# Patient Record
Sex: MALE | Race: WHITE | NOT HISPANIC OR LATINO
[De-identification: names, ages, dates, MRNs, and addresses within clinical notes are randomized per-mention and may not be internally consistent; named-entity substitution may affect disease eponyms.]

---

## 2017-10-03 ENCOUNTER — RECORDS - HEALTHEAST (OUTPATIENT)
Dept: ADMINISTRATIVE | Facility: OTHER | Age: 52
End: 2017-10-03

## 2017-10-09 ENCOUNTER — RECORDS - HEALTHEAST (OUTPATIENT)
Dept: ADMINISTRATIVE | Facility: OTHER | Age: 52
End: 2017-10-09

## 2017-10-10 ENCOUNTER — COMMUNICATION - HEALTHEAST (OUTPATIENT)
Dept: FAMILY MEDICINE | Facility: CLINIC | Age: 52
End: 2017-10-10

## 2017-10-23 ENCOUNTER — OFFICE VISIT - HEALTHEAST (OUTPATIENT)
Dept: FAMILY MEDICINE | Facility: CLINIC | Age: 52
End: 2017-10-23

## 2017-10-23 DIAGNOSIS — K63.5 COLON POLYP: ICD-10-CM

## 2017-10-23 DIAGNOSIS — Z01.818 PREOPERATIVE EXAMINATION: ICD-10-CM

## 2017-10-23 DIAGNOSIS — K62.89 RECTAL MASS: ICD-10-CM

## 2017-10-23 LAB
ATRIAL RATE - MUSE: 48 BPM
DIASTOLIC BLOOD PRESSURE - MUSE: NORMAL MMHG
INTERPRETATION ECG - MUSE: NORMAL
P AXIS - MUSE: 25 DEGREES
PR INTERVAL - MUSE: 160 MS
QRS DURATION - MUSE: 82 MS
QT - MUSE: 418 MS
QTC - MUSE: 373 MS
R AXIS - MUSE: 24 DEGREES
SYSTOLIC BLOOD PRESSURE - MUSE: NORMAL MMHG
T AXIS - MUSE: 16 DEGREES
VENTRICULAR RATE- MUSE: 48 BPM

## 2017-10-23 ASSESSMENT — MIFFLIN-ST. JEOR: SCORE: 1585.82

## 2017-11-03 ENCOUNTER — RECORDS - HEALTHEAST (OUTPATIENT)
Dept: ADMINISTRATIVE | Facility: OTHER | Age: 52
End: 2017-11-03

## 2017-11-04 ENCOUNTER — RECORDS - HEALTHEAST (OUTPATIENT)
Dept: ADMINISTRATIVE | Facility: OTHER | Age: 52
End: 2017-11-04

## 2019-07-30 ENCOUNTER — OFFICE VISIT - HEALTHEAST (OUTPATIENT)
Dept: FAMILY MEDICINE | Facility: CLINIC | Age: 54
End: 2019-07-30

## 2019-07-30 DIAGNOSIS — S61.011A LACERATION OF RIGHT THUMB WITHOUT FOREIGN BODY WITHOUT DAMAGE TO NAIL, INITIAL ENCOUNTER: ICD-10-CM

## 2019-08-06 ENCOUNTER — OFFICE VISIT - HEALTHEAST (OUTPATIENT)
Dept: FAMILY MEDICINE | Facility: CLINIC | Age: 54
End: 2019-08-06

## 2019-08-06 DIAGNOSIS — S61.011D LACERATION OF RIGHT THUMB WITHOUT FOREIGN BODY WITHOUT DAMAGE TO NAIL, SUBSEQUENT ENCOUNTER: ICD-10-CM

## 2019-08-06 ASSESSMENT — MIFFLIN-ST. JEOR: SCORE: 1603.96

## 2020-07-27 ENCOUNTER — OFFICE VISIT - HEALTHEAST (OUTPATIENT)
Dept: FAMILY MEDICINE | Facility: CLINIC | Age: 55
End: 2020-07-27

## 2020-07-27 DIAGNOSIS — R09.81 CONGESTION OF PARANASAL SINUS: ICD-10-CM

## 2020-07-27 DIAGNOSIS — I10 BENIGN ESSENTIAL HYPERTENSION: ICD-10-CM

## 2020-07-27 RX ORDER — NAPROXEN 500 MG/1
500 TABLET ORAL 2 TIMES DAILY WITH MEALS
Qty: 60 TABLET | Refills: 2 | Status: SHIPPED | OUTPATIENT
Start: 2020-07-27

## 2021-05-26 VITALS
TEMPERATURE: 98.2 F | DIASTOLIC BLOOD PRESSURE: 83 MMHG | OXYGEN SATURATION: 98 % | SYSTOLIC BLOOD PRESSURE: 135 MMHG | HEART RATE: 61 BPM | RESPIRATION RATE: 12 BRPM

## 2021-05-30 NOTE — PROGRESS NOTES
Assessment:        1. Laceration of right thumb without foreign body without damage to nail, initial encounter  lidocaine 10 mg/mL (1 %) injection 3 mL         Plan:       The wound area was cleansed with chlorhexidine gluconate and draped in a sterile fashion.  The wound area was anesthetized with Lidocaine 1% without epinephrine with added sodium bicarbonate.  The wound was explored with the following results No foreign bodies found, No tendon laceration seen.  The wound was repaired with 4-0 Nylon; 6 sutures were used.  The wound was dressed and antibiotic ointment was applied.  Wound care discussed.  Tetanus immunization not indicated.  Suture removal in 7 days.   Discussed signs of worsening symptoms and when to follow-up with PCP if no symptom improvement.      Patient Instructions   Your wound was closed with sutures and covered with antibiotic ointment and a non-adhesive dressing.    General wound maintenance:  - Dressings should be left in place for 24 hours, afterwards wound may be left open to air  - May continue to cover injury site with a topical antibiotic such as neosporin  - A simple band-aid will work to protect small wounds and keep topical antibiotic on  - Cloth gauze are more useful for covering larger wounds with tape to secure  - May shower normally if it has been longer than 12 hours since sutures were placed    Avoid any swimming until after sutures have been removed.    Follow-up in 7 days to remove your sutures.    Come back sooner if you develop fever or your wound site becomes increasingly red, tender, producing pus or other drainage, or reopens.      Subjective:       Quang Parks is a 54 y.o. male who presents for evaluation of a laceration to the right thumb. Injury occurred this morning. The mechanism of the wound was a piece of glass. The patient reports no numbness and no paresthesias in the affected area. There were no other injuries. The tetanus status is within past 5 years.      The following portions of the patient's history were reviewed and updated as appropriate: allergies, current medications and problem list.    Review of Systems  Pertinent items are noted in HPI.    Allergies  No Known Allergies      Objective:       /78 (Patient Site: Right Arm, Patient Position: Sitting, Cuff Size: Adult Regular)   Pulse 70   Temp 97.9  F (36.6  C) (Oral)   Resp 17   Wt 176 lb 2 oz (79.9 kg)   SpO2 96%   BMI 27.18 kg/m      There is a curved laceration measuring approximately 2.5 cm in length on the right dorsal thumb. Examination of the wound for foreign bodies and devitalized tissue showed none.  Examination of the surrounding area for neural or vascular damage showed none. Able to visualize the tendon and 1st MTP joint capsule, but no damage. FROM of all thumb joints without difficulty.

## 2021-05-30 NOTE — PATIENT INSTRUCTIONS - HE
Your wound was closed with sutures and covered with antibiotic ointment and a non-adhesive dressing.    General wound maintenance:  - Dressings should be left in place for 24 hours, afterwards wound may be left open to air  - May continue to cover injury site with a topical antibiotic such as neosporin  - A simple band-aid will work to protect small wounds and keep topical antibiotic on  - Cloth gauze are more useful for covering larger wounds with tape to secure  - May shower normally if it has been longer than 12 hours since sutures were placed    Avoid any swimming until after sutures have been removed.    Follow-up in 7 days to remove your sutures.    Come back sooner if you develop fever or your wound site becomes increasingly red, tender, producing pus or other drainage, or reopens.

## 2021-05-31 VITALS — HEIGHT: 68 IN | WEIGHT: 174 LBS | BODY MASS INDEX: 26.37 KG/M2

## 2021-05-31 NOTE — PROGRESS NOTES
Chief Complaint   Patient presents with     remove stitches     right thumb         HPI:    Patient is here for suture removal. He sustained laceration to R thumb and had it repaired 7/30. Patient reported doing well today with minimal pain. No fever, chills. Redness around wound, pus drainage from wound. However, he said he has not been able to straighten the affected joint much. NO tingling nor numbness of the R thumb.    ROS: Pertinent ROS noted in HPI.     No Known Allergies    Patient Active Problem List   Diagnosis     History of alcohol abuse     Gout attack     Colon polyp       Family History   Adopted: Yes   Family history unknown: Yes       Social History     Socioeconomic History     Marital status:      Spouse name: Not on file     Number of children: Not on file     Years of education: Not on file     Highest education level: Not on file   Occupational History     Not on file   Social Needs     Financial resource strain: Not on file     Food insecurity:     Worry: Not on file     Inability: Not on file     Transportation needs:     Medical: Not on file     Non-medical: Not on file   Tobacco Use     Smoking status: Former Smoker     Smokeless tobacco: Never Used     Tobacco comment: smoked for 3 months in 1994   Substance and Sexual Activity     Alcohol use: Not on file     Drug use: Not on file     Sexual activity: Not on file   Lifestyle     Physical activity:     Days per week: Not on file     Minutes per session: Not on file     Stress: Not on file   Relationships     Social connections:     Talks on phone: Not on file     Gets together: Not on file     Attends Ep[employer]opalian service: Not on file     Active member of club or organization: Not on file     Attends meetings of clubs or organizations: Not on file     Relationship status: Not on file     Intimate partner violence:     Fear of current or ex partner: Not on file     Emotionally abused: Not on file     Physically abused: Not on file      Forced sexual activity: Not on file   Other Topics Concern     Not on file   Social History Narrative     Not on file         Objective:      Vitals:    08/06/19 0715   BP: 114/62   Pulse: 65   SpO2: 98%       Gen:NAD  MSK: There is a wound over the dorsal aspect of the right first interphalangeal joint with 6 sutures in interrupted fashion. Wound has healed well with good approximation.  No dehiscence. No erythema, edema, fluctuance, nor purulence. The interphalangeal joint was held fixed at about 45 degrees. Patient can fully flex the affected joint but he could not straighten the joint. Patient has good strength (flexion) of the affected joint.   Neuro: intact gross sensation of the right thumb.  CV: Normal cap refill of the right distal phalanx.     Assessment:    Laceration of R thumb. Initial visit did not noted any evidence of tendon injury. However, patient has had difficulty extending the right first interphalangeal joint.    Plan    #1. Wound was cleaned, and sutures removed in standard fashion followed by placement of steri strips.  #2. Discussed home cares, and signs/symptoms of wound infection with f/u advice as indicated.  #3. Advised patient to follow up with Orthopedics ([facility]) within a week to assess range of motion issue.

## 2021-06-03 VITALS — WEIGHT: 178 LBS | BODY MASS INDEX: 26.98 KG/M2 | HEIGHT: 68 IN

## 2021-06-03 VITALS — BODY MASS INDEX: 27.18 KG/M2 | WEIGHT: 176.13 LBS

## 2021-06-10 NOTE — PROGRESS NOTES
Assessment & Plan:       1. Congestion of paranasal sinus  fluticasone (VERAMYST) 27.5 mcg/actuation nasal spray    naproxen (NAPROSYN) 500 MG tablet   2. Benign essential hypertension        Medical Decision Making  Patient presents with acute onset headache most consistent with sinus congestion of the left frontal sinus.  There are no signs of bacterial sinusitis as patient has no fevers and no tenderness to percussion over the sinuses.  Will have patient use a trial of nasal steroids.  Will also use oral naproxen to help with headaches.  Recommended taking naproxen with food.  Patient will follow-up nonurgently to have blood pressure rechecked in 1 to 2 weeks.  Discussed signs of worsening symptoms and when to follow-up with PCP if no symptom improvement.     Patient Instructions   You were seen today for sinus congestion and/or pain. This is likely due to a viral illness.    Symptoms management:  - May use Tylenol or Ibuprofen for discomfort and/or fever if present  - May try saline irrigation to relieve congestion (see instructions below)  - Use of nasal steroids (Flonase) as prescribed  - If you are experiencing ear fullness, may try an oral decongestant such as sudafed    Reasons to come back for re-evaluation:  - Develop a fever of 100.4F or current fever worsens  - Sudden and severe pain in the face and head  - Troubles seeing or double vision  - Swelling or redness around one or both eyes  - Sfiff neck  - Symptoms have not improved after 7 days    Buffered normal saline nasal irrigation   The benefits   1. Saline (saltwater) washes the mucus and irritants from your nose.   2. The sinus passages are moisturized.   3. Studies have also shown that a nasal irrigation improves cell function (the cells that move the mucus work better).   The recipe   Use a one-quart glass jar that is thoroughly cleansed.   You may use a large medical syringe (30 cc), water pick with an irrigation tip (preferred method),  squeeze bottle, or Neti pot. Do not use a baby bulb syringe. The syringe or pick should be sterilized frequently or replaced every two to three weeks to avoid contamination and infection.   Fill with water that has been distilled, previously boiled, or otherwise sterilized. Plain tap water is not recommended, because it is not necessarily sterile.   Add 1 to 1  heaping teaspoons of pickling/chalo salt. Do not use table salt, because it contains a large number of additives.   Add 1 teaspoon of baking soda (pure bicarbonate).   Mix ingredients together, and store at room temperature. Discard after one week.   You may also make up a solution from premixed packets that are commercially prepared specifically for nasal irrigation.   The instructions   Irrigate your nose with saline one to two times per day.   If you have been told to use nasal medication, you should always use your saline solution first. The nasal medication is much more effective when sprayed onto clean nasal membranes, and the spray will reach deeper into the nose.   Pour the amount of fluid you plan to use into a clean bowl. Do not put your used syringe back into the storage container, because it contaminates your solution.   You may warm the solution slightly in the microwave, but be sure that the solution is not hot.   Bend over the sink (some people do this in the shower) and squirt the solution into each side of your nose, aiming the stream toward the back of your head, not the top of your head. The solution should flow into one nostril and out of the other, but it will not harm you if you swallow a little.   Some people experience a little burning sensation the first few times they use buffered saline solution, but this usually goes away after they adapt to it.     Patient to follow up with Primary Care provider regarding elevated blood pressure.          Subjective:       Quang Parks is a 55 y.o. male here for evaluation of headache.  Onset  of symptoms was 5 days ago with gradual improvement since then.  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  Patient otherwise denies a stuffy nose, rhinorrhea, and postnasal drip.  He denies tooth pain, jaw pain, fevers, vision changes, numbness, tingling, and muscle weakness.  He has no history of migraines.  Patient has tried doses of Tylenol with some mild relief.    The following portions of the patient's history were reviewed and updated as appropriate: allergies, current medications and problem list.    Review of Systems  Pertinent items are noted in HPI.     Allergies  No Known Allergies    Family History   Adopted: Yes   Family history unknown: Yes       Social History     Socioeconomic History     Marital status:      Spouse name: None     Number of children: None     Years of education: None     Highest education level: None   Occupational History     None   Social Needs     Financial resource strain: None     Food insecurity     Worry: None     Inability: None     Transportation needs     Medical: None     Non-medical: None   Tobacco Use     Smoking status: Former Smoker     Smokeless tobacco: Never Used     Tobacco comment: smoked for 3 months in 1994   Substance and Sexual Activity     Alcohol use: None     Drug use: None     Sexual activity: None   Lifestyle     Physical activity     Days per week: None     Minutes per session: None     Stress: None   Relationships     Social connections     Talks on phone: None     Gets together: None     Attends Shinto service: None     Active member of club or organization: None     Attends meetings of clubs or organizations: None     Relationship status: None     Intimate partner violence     Fear of current or ex partner: None     Emotionally abused: None     Physically abused: None     Forced sexual activity: None   Other Topics Concern     None    Social History Narrative     None         Objective:       /83   Pulse 61   Temp 98.2  F (36.8  C) (Oral)   Resp 12   SpO2 98%   General appearance: alert, appears stated age, cooperative, no distress and non-toxic  Head: Normocephalic, without obvious abnormality, atraumatic; sinuses nontender to percussion  Ears: TMs intact with mucoid fluid and moderate bulging bilaterally, no erythema, external ears normal  Nose: no discharge  Throat: lips, mucosa, and tongue normal; teeth and gums normal  Neck: no adenopathy and supple, symmetrical, trachea midline  Lungs: clear to auscultation bilaterally  Heart: regular rate and rhythm, S1, S2 normal, no murmur, click, rub or gallop

## 2021-06-13 NOTE — PROGRESS NOTES
Preoperative Exam    Diagnosis: rectal mass on screening colonscopy  Procedure: transanal endo microsurgery  Date: 11/3/17  Surgeon: Dr Claudine Han  Location:  [facility]    HPI: Patient is a 52 y.o. male who is coming to the clinic for preoperative evaluation in regards to the above diagnosis.  10/17  screening colonoscopy with polyp and rectal mass.  Pt asymptomatic.  Bowels chronically irregular----but no recent changes.  No blood in stool.  No adominal pain.  Pt quit drinking alcohol >1 year ago; increased sugar and caffeine in diet now.    Patient has discussed the risks, benefits, alternatives to the upcoming surgery with the surgeon.    No past medical history on file.  Past Surgical History:   Procedure Laterality Date     FINGER SURGERY      in his 20's, reattachment after amputation     Social History     Social History     Marital status:      Spouse name: N/A     Number of children: N/A     Years of education: N/A     Occupational History     Not on file.     Social History Main Topics     Smoking status: Former Smoker     Smokeless tobacco: Not on file      Comment: smoked for 3 months in 1994     Alcohol use Not on file     Drug use: Not on file     Sexual activity: Not on file     Other Topics Concern     Not on file     Social History Narrative     Current Outpatient Prescriptions on File Prior to Visit   Medication Sig Dispense Refill     indomethacin (INDOCIN) 50 MG capsule        No current facility-administered medications on file prior to visit.      No Known Allergies        ROS:  10 point review of systems otherwise positive for:  None.  History of gout without any flares since quitting drinking.  Patient sober greater than 1 year.    Specifically patient denies any symptoms of recent cardiovascular issues, chest pain, chest pressure, shortness of breath, orthopnea, paroxysmal dyspnea, breathing concerns, or dramatic changes in exercise tolerance.  No current cough, fever, or  URI symptoms.     No personal history bleeding disorders or blood clots.  No personal history of anesthesia.  No known family hx---adopted.    EXAM:  Vitals:    10/23/17 0918   BP: 120/66   Pulse: 78   Resp: 16   Temp: 98.3  F (36.8  C)     Constitutional:   Alert, NAD   Eyes: pupils equal and reactive to light, extraocular movements intact    ENT:  Nose: clear.  Throat:  clear without evidence of acute infection  Neck:   Supple, No significant adenopathy; no evidence of carotid bruit or thrill bilaterally  Respiratory:  Clear without wheeze, ronchi or crackles; no increased work of breathing.  Cardiac:   Normal S1, S2.  Regular rate and rhythm without murmur  Gastrointestinal:   Abdomen soft,  nontender , no hepatosplenomegaly or mass palpable.  Musculoskeletal:  No significant deformity.  Neurologic: no evidence of weakness of the upper or lower extremities  Skin:  No evidence of rash or jaundice    DIAGNOSTICS:  Labs performed include:  hemoglobin  EKG demonstrates: see copy---bradycardia w/ no significant changes from  EKG '08 (old inferior changes); final read per cardiology    ASSESSMENT/ PLAN:    1. Preoperative examination--rectal mass  -Diagnosis of rectal polyp/ rectal mass, planning to undergo transanal endo microsurgery; patient is cleared for surgery at this time.    -No evidence of signficant cardiovascular risk that would prevent upcoming surgery.    The patient was asked the following questions to assess whether he/she was at increased cardiovascular risk for surgery; at this time the patient is able to meet the following demand without chest pain or shortness of breath:  do heavy work around the house such as scrubbing floors or lifting or moving heavy furniture (between 4 and 10 METs)    Surgical risk:  This surgery is considered intermediate risk involving carotid endarterectomy, Head and neck surgery, Intraperitoneal and intrathoracic surgery, Orthopedic surgery, or Prostate surgery    -  Hemoglobin  - Electrocardiogram Perform and Read    2. Colon polyp  Repeat colonoscopy 1 year per report from 10/17 colonoscopy    3. Sober  >1 year  Patient congratulated on success with sobriety.    Routine healthcare maintenance reviewed.  Glucose and cholesterol done 2016.  Tetanus up-to-date.  Would recommend seasonal flu shot and patient currently defers.  He does state it is available at work.  I would strongly encourage him to consider it.  Consider PSA with next physical.      Mony Rogers MD  [facility]

## 2021-06-16 PROBLEM — K63.5 COLON POLYP: Status: ACTIVE | Noted: 2017-10-08

## 2021-08-21 ENCOUNTER — HEALTH MAINTENANCE LETTER (OUTPATIENT)
Age: 56
End: 2021-08-21

## 2021-10-16 ENCOUNTER — HEALTH MAINTENANCE LETTER (OUTPATIENT)
Age: 56
End: 2021-10-16

## 2022-10-01 ENCOUNTER — HEALTH MAINTENANCE LETTER (OUTPATIENT)
Age: 57
End: 2022-10-01

## 2023-02-28 NOTE — PATIENT INSTRUCTIONS - HE
You were seen today for sinus congestion and/or pain. This is likely due to a viral illness.    Symptoms management:  - May use Tylenol or Ibuprofen for discomfort and/or fever if present  - May try saline irrigation to relieve congestion (see instructions below)  - Use of nasal steroids (Flonase) as prescribed  - If you are experiencing ear fullness, may try an oral decongestant such as sudafed    Reasons to come back for re-evaluation:  - Develop a fever of 100.4F or current fever worsens  - Sudden and severe pain in the face and head  - Troubles seeing or double vision  - Swelling or redness around one or both eyes  - Sfiff neck  - Symptoms have not improved after 7 days    Buffered normal saline nasal irrigation   The benefits   1. Saline (saltwater) washes the mucus and irritants from your nose.   2. The sinus passages are moisturized.   3. Studies have also shown that a nasal irrigation improves cell function (the cells that move the mucus work better).   The recipe   Use a one-quart glass jar that is thoroughly cleansed.   You may use a large medical syringe (30 cc), water pick with an irrigation tip (preferred method), squeeze bottle, or Neti pot. Do not use a baby bulb syringe. The syringe or pick should be sterilized frequently or replaced every two to three weeks to avoid contamination and infection.   Fill with water that has been distilled, previously boiled, or otherwise sterilized. Plain tap water is not recommended, because it is not necessarily sterile.   Add 1 to 1  heaping teaspoons of pickling/chalo salt. Do not use table salt, because it contains a large number of additives.   Add 1 teaspoon of baking soda (pure bicarbonate).   Mix ingredients together, and store at room temperature. Discard after one week.   You may also make up a solution from premixed packets that are commercially prepared specifically for nasal irrigation.   The instructions   Irrigate your nose with saline one to two  times per day.   If you have been told to use nasal medication, you should always use your saline solution first. The nasal medication is much more effective when sprayed onto clean nasal membranes, and the spray will reach deeper into the nose.   Pour the amount of fluid you plan to use into a clean bowl. Do not put your used syringe back into the storage container, because it contaminates your solution.   You may warm the solution slightly in the microwave, but be sure that the solution is not hot.   Bend over the sink (some people do this in the shower) and squirt the solution into each side of your nose, aiming the stream toward the back of your head, not the top of your head. The solution should flow into one nostril and out of the other, but it will not harm you if you swallow a little.   Some people experience a little burning sensation the first few times they use buffered saline solution, but this usually goes away after they adapt to it.     Patient to follow up with Primary Care provider regarding elevated blood pressure.     MAR

## 2023-10-15 ENCOUNTER — HEALTH MAINTENANCE LETTER (OUTPATIENT)
Age: 58
End: 2023-10-15